# Patient Record
Sex: FEMALE | Race: BLACK OR AFRICAN AMERICAN | NOT HISPANIC OR LATINO | URBAN - METROPOLITAN AREA
[De-identification: names, ages, dates, MRNs, and addresses within clinical notes are randomized per-mention and may not be internally consistent; named-entity substitution may affect disease eponyms.]

---

## 2023-01-30 ENCOUNTER — APPOINTMENT (RX ONLY)
Dept: URBAN - METROPOLITAN AREA CLINIC 45 | Facility: CLINIC | Age: 48
Setting detail: DERMATOLOGY
End: 2023-01-30

## 2023-01-30 DIAGNOSIS — L68.0 HIRSUTISM: ICD-10-CM | Status: INADEQUATELY CONTROLLED

## 2023-01-30 DIAGNOSIS — L91.8 OTHER HYPERTROPHIC DISORDERS OF THE SKIN: ICD-10-CM

## 2023-01-30 DIAGNOSIS — Z41.9 ENCOUNTER FOR PROCEDURE FOR PURPOSES OTHER THAN REMEDYING HEALTH STATE, UNSPECIFIED: ICD-10-CM

## 2023-01-30 PROBLEM — D23.71 OTHER BENIGN NEOPLASM OF SKIN OF RIGHT LOWER LIMB, INCLUDING HIP: Status: ACTIVE | Noted: 2023-01-30

## 2023-01-30 PROCEDURE — 99202 OFFICE O/P NEW SF 15 MIN: CPT | Mod: 25

## 2023-01-30 PROCEDURE — ? SKIN TAG REMOVAL

## 2023-01-30 PROCEDURE — ? COSMETIC QUOTE

## 2023-01-30 PROCEDURE — ? COUNSELING

## 2023-01-30 PROCEDURE — ? ADDITIONAL NOTES

## 2023-01-30 PROCEDURE — 11200 RMVL SKIN TAGS UP TO&INC 15: CPT

## 2023-01-30 PROCEDURE — ? FULL BODY SKIN EXAM - DECLINED

## 2023-01-30 ASSESSMENT — LOCATION DETAILED DESCRIPTION DERM
LOCATION DETAILED: RIGHT CHIN
LOCATION DETAILED: LEFT MEDIAL BREAST 7-8:00 REGION

## 2023-01-30 ASSESSMENT — LOCATION ZONE DERM
LOCATION ZONE: TRUNK
LOCATION ZONE: FACE

## 2023-01-30 ASSESSMENT — LOCATION SIMPLE DESCRIPTION DERM
LOCATION SIMPLE: LEFT BREAST
LOCATION SIMPLE: CHIN

## 2023-01-30 NOTE — PROCEDURE: ADDITIONAL NOTES
Additional Notes: Patient consent was obtained to proceed with the visit and recommended plan of care after discussion of all risks and benefits, including the risks of COVID-19 exposure.
Detail Level: Simple
Render Risk Assessment In Note?: yes
Additional Notes: Counseled Pt about laser treatment
Render Risk Assessment In Note?: no
Detail Level: Zone

## 2023-01-30 NOTE — PROCEDURE: SKIN TAG REMOVAL
Detail Level: Detailed
Consent: Written consent obtained and the risks of skin tag removal was reviewed with the patient including but not limited to bleeding, pigmentary change, infection, pain, and remote possibility of scarring.
Anesthesia Type: 1% lidocaine with epinephrine
Anesthesia Volume In Cc: 3
Include Z78.9 (Other Specified Conditions Influencing Health Status) As An Associated Diagnosis?: No
Medical Necessity Clause: This procedure was medically necessary because the lesions that were treated were:
Add Associated Diagnoses If Applicable When Selecting Medical Necessity: Yes
Medical Necessity Information: It is in your best interest to select a reason for this procedure from the list below. All of these items fulfill various CMS LCD requirements except the new and changing color options.

## 2023-01-30 NOTE — HPI: SKIN LESION
What Type Of Note Output Would You Prefer (Optional)?: Bullet Format
How Severe Is Your Skin Lesion?: mild
Has Your Skin Lesion Been Treated?: not been treated
Is This A New Presentation, Or A Follow-Up?: Skin Lesions
Additional History: New pt \\nHas skin tag she would like removed on left nipple. Per pt was swollen and burst. Also Pt has spot on right leg from mosquito bite many years ago \\n

## 2023-01-30 NOTE — PROCEDURE: COSMETIC QUOTE
Detail Level: Zone
Include Sales Tax On Anesthesia Fees: No
Injectable  11 Price/Unit (In Dollars- Use Only Numbers And Decimals): 0.00
Laser 14 Units: 0
Laser 11: Laser Hair Removal Lip/Chin
Laser 4 Price/Unit (In Dollars- Use Only Numbers And Decimals): 475.00
Face Procedure 4: IPL laser
Injectable 1 Price/Unit (In Dollars- Use Only Numbers And Decimals): 15.00
Face Procedure 10 Raphael/Unit (In Dollars- Use Only Numbers And Decimals): 379.00
Anesthesia Fee Units (Optional): 1
Injectable 5: Voluma
Laser 11 Units: 8
Laser 8: CoolPeel Full face
Laser 1 Price/Unit (In Dollars- Use Only Numbers And Decimals): 200.00
Face Procedure 1: VI precision plus peel
Body Procedure 4 Price/Unit (In Dollars- Use Only Numbers And Decimals): 75.00
Send Charges To Patient Encounter: Yes
Face Procedure 7 Price/Unit (In Dollars- Use Only Numbers And Decimals): 100.00
Injectable 2: Botox
Laser 11 Percentage Discount: 15
Laser 5: Laser Hair Removal Brazilian
Laser 15 Price/Unit (In Dollars- Use Only Numbers And Decimals): 499.00
Laser 19: Laser Hair Removal Full Back
Laser 12 Price/Unit (In Dollars- Use Only Numbers And Decimals): 199.00
Laser 2: Laser Hair Removal Full Body
Body Procedure 1 Price/Unit (In Dollars- Use Only Numbers And Decimals): 575.00
Face Procedure 2 Price/Unit (In Dollars- Use Only Numbers And Decimals): 450.00
Laser 16: Laser Hair Removal Chest
Face Procedure 8: Hydrafacial Deluxe
Laser 16 Price/Unit (In Dollars- Use Only Numbers And Decimals): 299.00
Laser 6: Laser Hair Removal Bikini
Injectable 3: Botox (DOA)
Laser 3: Laser Hair Removal Full Legs
Laser 20: Laser Hair Removal Buttocks
Body Procedure 2 Price/Unit (In Dollars- Use Only Numbers And Decimals): 800.00
Face Procedure 9: IPL Spot Treatment
Face Procedure 3 Price/Unit (In Dollars- Use Only Numbers And Decimals): 550.00
Laser 17: Laser Hair Removal Abdomen
Body Procedure 3: Add on Viva abdominal Scar
Face Procedure 6: V-Beam Laser
Injectable 4 Price/Unit (In Dollars- Use Only Numbers And Decimals): 700.00
Laser 14: Laser Hair Removal 1/2 Arms
Laser 7 Price/Unit (In Dollars- Use Only Numbers And Decimals): 99.00
Face Procedure 10: SkinPen micronedling
Laser 1: Add on IPL
Laser 18: Laser Hair Removal 1/2 Back
Face Procedure 1 Price/Unit (In Dollars- Use Only Numbers And Decimals): 429.00
Body Procedure 4: Add on extractions
Face Procedure 7: Add on V-Beam veins
Laser 15: Laser Hair Removal Full Arms
Injectable 5 Price/Unit (In Dollars- Use Only Numbers And Decimals): 900.00
Laser 5 Price/Unit (In Dollars- Use Only Numbers And Decimals): 399.00
Body Procedure 1: Keravive
Laser 12: Laser hair Removal Full Face/ Beard
Laser 19 Price/Unit (In Dollars- Use Only Numbers And Decimals): 500.00
Laser 2 Price/Unit (In Dollars- Use Only Numbers And Decimals): 1000.00
Face Procedure 2: coolpeel laser around mouth
Face Procedure 5: Viva Add on Neck
Body Procedure 2: Viva with IPL
Laser 13: Laser Hair Removal Under Arms
Laser 3 Price/Unit (In Dollars- Use Only Numbers And Decimals): 675.00
Face Procedure 3: Viva Full Face
Laser 7: Laser Hair Removal Beard Outline
Injectable 4: Facial Fillers (Defyne)
Face Procedure 6 Price/Unit (In Dollars- Use Only Numbers And Decimals): 400.00
Body Procedure 3 Price/Unit (In Dollars- Use Only Numbers And Decimals): 100-200
Laser 4: Laser Hair Removal 1/2 Leg
Injectable 1: Botox (eyes)

## 2023-05-26 ENCOUNTER — DASHBOARD ENCOUNTERS (OUTPATIENT)
Age: 48
End: 2023-05-26

## 2023-05-26 ENCOUNTER — LAB OUTSIDE AN ENCOUNTER (OUTPATIENT)
Dept: URBAN - NONMETROPOLITAN AREA CLINIC 4 | Facility: CLINIC | Age: 48
End: 2023-05-26

## 2023-05-26 ENCOUNTER — OFFICE VISIT (OUTPATIENT)
Dept: URBAN - NONMETROPOLITAN AREA CLINIC 4 | Facility: CLINIC | Age: 48
End: 2023-05-26
Payer: COMMERCIAL

## 2023-05-26 ENCOUNTER — WEB ENCOUNTER (OUTPATIENT)
Dept: URBAN - NONMETROPOLITAN AREA CLINIC 4 | Facility: CLINIC | Age: 48
End: 2023-05-26

## 2023-05-26 VITALS
SYSTOLIC BLOOD PRESSURE: 114 MMHG | BODY MASS INDEX: 29.26 KG/M2 | WEIGHT: 209 LBS | DIASTOLIC BLOOD PRESSURE: 70 MMHG | TEMPERATURE: 97.9 F | HEART RATE: 94 BPM | HEIGHT: 71 IN

## 2023-05-26 DIAGNOSIS — Z12.11 COLON CANCER SCREENING: ICD-10-CM

## 2023-05-26 DIAGNOSIS — Z80.0 FAMILY HISTORY OF RECTAL CANCER: ICD-10-CM

## 2023-05-26 PROCEDURE — 99203 OFFICE O/P NEW LOW 30 MIN: CPT | Performed by: REGISTERED NURSE

## 2023-05-26 RX ORDER — METFORMIN ER 500 MG 500 MG/1
TABLET ORAL
Qty: 360 TABLET | Status: ACTIVE | COMMUNITY

## 2023-05-26 RX ORDER — LISINOPRIL 5 MG/1
TAKE 1 TABLET BY MOUTH EVERY DAY TABLET ORAL
Qty: 90 EACH | Refills: 0 | Status: ACTIVE | COMMUNITY

## 2023-05-26 RX ORDER — SODIUM PICOSULFATE, MAGNESIUM OXIDE, AND ANHYDROUS CITRIC ACID 10; 3.5; 12 MG/160ML; G/160ML; G/160ML
160ML LIQUID ORAL TWICE PER DAY
Qty: 320 MILLILITER | Refills: 0 | OUTPATIENT
Start: 2023-05-26

## 2023-05-26 RX ORDER — ATORVASTATIN CALCIUM, FILM COATED 10 MG/1
TAKE 1 TABLET BY MOUTH EVERY DAY TABLET ORAL
Qty: 90 EACH | Refills: 0 | Status: ACTIVE | COMMUNITY

## 2023-05-26 NOTE — HPI-TODAY'S VISIT:
5/26/23: Pt is a 46 yo female with PMH of DM2, HLD, HTN who was refered by Dr. Selene Aguiar for screening colonoscopy. A copy of this document will be sent to the referring physician.   The patient presents for a colon cancer screening. Father had rectal cancer (Dx in 50s). Patient reports loose stools and occasional abdomianl pain since starting Metformin 2 months ago. She reports mild intermittent rectal bleeding. Denies rectal pain or abdominal pain. Denies unintentional weight loss. Patient denies cardiopulmonary disease, intake of blood thinners or problems with anesthesia. Recent blood work normal.

## 2023-05-26 NOTE — PHYSICAL EXAM CHEST:
chest wall non-tender, breathing is unlabored without accessory muscle use, normal breath sounds normal (ped)... crying

## 2023-05-30 ENCOUNTER — TELEPHONE ENCOUNTER (OUTPATIENT)
Dept: URBAN - NONMETROPOLITAN AREA CLINIC 4 | Facility: CLINIC | Age: 48
End: 2023-05-30

## 2023-05-30 RX ORDER — SODIUM PICOSULFATE, MAGNESIUM OXIDE, AND ANHYDROUS CITRIC ACID 12; 3.5; 1 G/175ML; G/175ML; MG/175ML
175 ML THE FIRST DOSE THE EVENING BEFORE AND SECOND DOSE THE MORNING OF COLONOSCOPY LIQUID ORAL ONCE A DAY
Qty: 350 | OUTPATIENT
Start: 2023-05-31 | End: 2023-06-02

## 2023-06-09 ENCOUNTER — OFFICE VISIT (OUTPATIENT)
Dept: URBAN - NONMETROPOLITAN AREA CLINIC 4 | Facility: CLINIC | Age: 48
End: 2023-06-09

## 2023-06-26 ENCOUNTER — OFFICE VISIT (OUTPATIENT)
Dept: URBAN - METROPOLITAN AREA SURGERY CENTER 14 | Facility: SURGERY CENTER | Age: 48
End: 2023-06-26
Payer: COMMERCIAL

## 2023-06-26 DIAGNOSIS — Z12.11 COLON CANCER SCREENING: ICD-10-CM

## 2023-06-26 DIAGNOSIS — Z80.0 FAMILY HISTORY OF CANCER OF DIGESTIVE SYSTEM: ICD-10-CM

## 2023-06-26 PROCEDURE — G0105 COLORECTAL SCRN; HI RISK IND: HCPCS | Performed by: INTERNAL MEDICINE

## 2023-06-26 PROCEDURE — G8907 PT DOC NO EVENTS ON DISCHARG: HCPCS | Performed by: INTERNAL MEDICINE

## 2023-06-26 RX ORDER — METFORMIN ER 500 MG 500 MG/1
TABLET ORAL
Qty: 360 TABLET | Status: ACTIVE | COMMUNITY

## 2023-06-26 RX ORDER — LISINOPRIL 5 MG/1
TAKE 1 TABLET BY MOUTH EVERY DAY TABLET ORAL
Qty: 90 EACH | Refills: 0 | Status: ACTIVE | COMMUNITY

## 2023-06-26 RX ORDER — SODIUM PICOSULFATE, MAGNESIUM OXIDE, AND ANHYDROUS CITRIC ACID 10; 3.5; 12 MG/160ML; G/160ML; G/160ML
160ML LIQUID ORAL TWICE PER DAY
Qty: 320 MILLILITER | Refills: 0 | Status: ACTIVE | COMMUNITY
Start: 2023-05-26

## 2023-06-26 RX ORDER — ATORVASTATIN CALCIUM, FILM COATED 10 MG/1
TAKE 1 TABLET BY MOUTH EVERY DAY TABLET ORAL
Qty: 90 EACH | Refills: 0 | Status: ACTIVE | COMMUNITY